# Patient Record
(demographics unavailable — no encounter records)

---

## 2024-11-06 NOTE — HISTORY OF PRESENT ILLNESS
[FreeTextEntry1] : The patient is a 68-year-old right-handed man who was diagnosed with Parkinson disease in 2013.  At that time he developed tingling in his arm after rotator cuff surgery.  He saw Dr. Otero and was diagnosed with Parkinson disease.  He was started on medications which did help him.  1. Since last visit had increased freezing during the nighttime. Taking Stalevo 100. 6 times day at 2-4-00-3-6-9 with LD half to 45min with at least three doses of Stalevo. First dose kicks in within 2 hours and lasts for an hour, the rest of them kick in within half an hour lasts for two hours. wearing off symptoms are anxiety, tingling in the toes and brain fog 30 minutes before dose. Currently on amantadine 100mg TID (tried tapering off, did not tolerate). When ON, feels great/ 2. He does have some mild dyskinesias which are not bothersome.  His voice and face are low when he is off.  He has no drooling or dysphagia.  He does have trouble turning over in bed at night.  His handwriting is poor.  His walking and balance are good. He still plays basketball regularly.  He does get a tremor of the right arm and right leg when off. 3. Concerns of constipation: not taking anything for that, plan to resume prune juice and MiraLAX. 4. Mood is good, anxiety is better. Currently on venlafaxine twice a day. He does have an enlarged prostate.  There is no orthostasis.  There is no family history of Parkinson disease or other movement disorders.  His father and daughter have ET. 5. Complains of hallucinations, people crossing the room, has insight in them. 6. Neuropsychological eval showed generally intact cognition, deficits in visuospatial domain.  7. Playing pickleball.   medications Stalevo 100 6 times a day 6-10-2-6-10 every 4 hours levodopa -carbidopa 25/100 as needed venlafaxine  previous medications Ropinirole xadago Nourianz

## 2024-11-06 NOTE — DISCUSSION/SUMMARY
[FreeTextEntry1] : In summary, the patient is a 68-year-old right-handed man who was diagnosed with Parkinson disease in 2013. Major bothersome symptoms are motor fluctuations with  non-motor off including brain fog, anxiety and tingling and restlessness in feet.  Currently on Stalevo 100 6 times a day and amantadine 100 TID. currently also experiencing hallucinations, advised to reduce the amantadine and eventually stop which may help with the hallucinations.  Had an extensive discussion with the patient and wife regarding there questions and concerns regarding the DBS. His major concerns are non-motor offs including brain fog, anxiety and tingling in feet. Discussed extensively about the DBS procedure and outcome. discussed that the non-motor off periods may or may not improve with the DBS but may overall improve the fluctuations and hence the off period.  Patient has still not made decision about the DBS and wants to wait for pump before the considering DBS  Plan: 1. Taper off and stop the amantadine  2. Trial of rytary 4 caps 23.75/95 tid 3. They want to see Dr Mcgovern again to get more questions answered, can schedule. Will put on list for SQ LD.   We discussed the above impression, plan and recommendations during the visit. Counseling represented more then 50% of the 30 minute visit time.

## 2024-11-06 NOTE — PHYSICAL EXAM
[Person] : oriented to person [Place] : oriented to place [Time] : oriented to time [Registration Intact] : recent registration memory intact [Naming Objects] : no difficulty naming common objects [Repeating Phrases] : no difficulty repeating a phrase [Writing A Sentence] : no difficulty writing a sentence [Fluency] : fluency intact [Comprehension] : comprehension intact [Reading] : reading intact [Cranial Nerves Optic (II)] : visual acuity intact bilaterally,  visual fields full to confrontation, pupils equal round and reactive to light [Cranial Nerves Oculomotor (III)] : extraocular motion intact [Cranial Nerves Trigeminal (V)] : facial sensation intact symmetrically [Cranial Nerves Facial (VII)] : face symmetrical [Cranial Nerves Glossopharyngeal (IX)] : tongue and palate midline [Cranial Nerves Vestibulocochlear (VIII)] : hearing was intact bilaterally [Cranial Nerves Accessory (XI - Cranial And Spinal)] : head turning and shoulder shrug symmetric [Cranial Nerves Hypoglossal (XII)] : there was no tongue deviation with protrusion [Motor Strength] : muscle strength was normal in all four extremities [Motor Handedness Right-Handed] : the patient is right hand dominant [Sensation Tactile Decrease] : light touch was intact [Sensation Vibration Decrease] : vibration was intact [1+] : Brachioradialis left 1+ [0] : Ankle jerk left 0 [Dysdiadochokinesia Bilaterally] : not present [Coordination - Dysmetria Impaired Finger-to-Nose Bilateral] : not present [FreeTextEntry1] : He was seen 45 min after the last dose.  Still OFF.   Facial expression and voice were normal.  Extraocular movements were intact and no square jerks seen.  Tone was increased tone at the right>left  upper extremity.   Rapid alternating movements and finger taps and hand  were slowed bilaterally, worse on the right.  Foot tap and toe tap were slightly slowed on the right.  He easily got up from the chair without using his arms.  Gait is normal based and steady with good heel-to-toe stride and absent arm swing.  Pull test was negative.  He had no tremors.  He does have a mild intermittent right leg resting tremor at that time.

## 2025-03-07 NOTE — PHYSICAL EXAM
[None] : none [Cooperative] : cooperative [Full] : full [Clear] : clear [Linear/Goal Directed] : linear/goal directed [Average] : average [WNL] : within normal limits [de-identified] : Pt had to stand up several times during the interview [FreeTextEntry8] : "not feeling great" [de-identified] : dysphoric, stable, congruent with content [FreeTextEntry7] : denies suicidal ideation/intent/plan or non-suicidal self-injurious ideation/intent/plan or homicidal ideation/intent/plan

## 2025-03-07 NOTE — DISCUSSION/SUMMARY
[Low acute suicide risk] : Low acute suicide risk [No] : No [FreeTextEntry1] : RISK ASSESSMENT Acute risk factors for self-injurious/aggressive behaviors include: depressive symptoms Chronic risk factors include: Chronic medical illness, Parkinson's/NSTEMI history, hx of trauma of daughter's passing, hx panic attack, hx depression with thoughts of death without intent or plan. Protective factors include: domiciled, , supportive wife, responsibility to grandchildren, help-seeking, denies current suicidal ideation/intent/plan or non-suicidal self-injurious ideation/intent/plan or homicidal ideation/intent/plan, no past psych hosp. Overall, the pt is at a low acute and low chronic risk for self-injurious, suicidal or aggressive behaviors, and is appropriate for outpatient care at this time.

## 2025-03-07 NOTE — PHYSICAL EXAM
[None] : none [Cooperative] : cooperative [Full] : full [Clear] : clear [Linear/Goal Directed] : linear/goal directed [Average] : average [WNL] : within normal limits [de-identified] : Pt had to stand up several times during the interview [FreeTextEntry8] : "not feeling great" [de-identified] : dysphoric, stable, congruent with content [FreeTextEntry7] : denies suicidal ideation/intent/plan or non-suicidal self-injurious ideation/intent/plan or homicidal ideation/intent/plan

## 2025-03-07 NOTE — SOCIAL HISTORY
[FreeTextEntry1] : Born: [place] Grew up: [place]. Mother, father, sister 11 years older, . Father: quiet, reserved, worked a lot. Mother: fair, good mother, took good care of pt. Sister: ok relationship. Education/GPA: attended [employer]. Worked as an  for 40 years.  , two kids. Daughter passed away, has a son and two grandchildren.

## 2025-03-07 NOTE — PLAN
[FreeTextEntry4] : On initial assessment with writer 3/7/2025: 69 yo M, domiciled with wife, working as an , past psychiatric history notable for depression, anxiety, denies past psychiatric hospitalizations, denies past suicide attempts, denies substance use, denies legal hx/hx of violence, past medical history notable for NSTEMI around 9/2024, s/p aortic valve replacement with hx bicuspid aortic valve, Parkinson's diagnosed in 2013, HLD, presents for treatment for depression.   On initial assessment, the differential includes MDD, r/o adjustment disorder with mixed anxiety and depression, r/o Parkinson's associated depressive symptoms. We discussed how medications for depression may be less effective with Parkinson's, though still confer benefit. Given pt's and pt's wife's interest in specialist care, we discussed the consideration for pt to look into pursuing care at [facility]/outpatient program for medication management/therapy. Pt indicated a preference to follow up with [facility] Geriatric outpatient program. Writer provided pt with resources about [facility] Geriatric outpatient program. Pt for now will be continuing with venlafaxine 75mg twice a day. He said that his cardiologist is aware, and writer advised pt pt to let his cardiologist know about all future medication changes given the history of NSTEMI, and venlafaxine's effect on increasing BP/HR.  Labs: 9/11/2023: LFTs wnl, A1c 5.7% H, chol 179 wnl, trig 97 wnl, HDL 44 wnl, LDL calc 117 H, 9/12/2023: CBC wnl except RBC count 6.02 H, Hg 17.6 H, Hct 52.2 H, BMP wnl except Chl 111 H, glucose 109 H 9/22/2023: EKG QTcB 461ms, auto read LBBB and left axis, left atrial enlargement   PLAN -Discussed the diagnosis, treatment, alternatives to recommended treatment, risk Vs benefits of treatment and no treatment and alternative treatments. -Lab/other tests: advised to continue to check annual bloodwork with PCP. -Medication: can continue as previously prescribed venlafaxine 75mg twice a day.   SNRI side effects including but not limited to GI side effects, dry mouth, constipation, sweating, increased BP, urinary retention, sedation, headaches, dizziness, serotonin syndrome, hyponatremia, QT prolongation, weight gain, decreased libido, and black box warning of SI in patients younger than 24 were discussed. -Educated patient of importance of remaining abstinent from drugs and alcohol. -Emergency procedures were discussed: pt. educated to call 911 or go to nearest ER for worsening of symptoms/suicidal/homicidal ideation. -Pt will look into following up with [facility] Geriatric outpatient program. -Patient given opportunity to ask questions and their questions were answered and they expressed understanding and agreement with above plan.  I spent a total of 60 minutes reviewing past medical records, evaluating the patient, discussing treatment options with the patient, prescribing medication, and documenting in the EMR.

## 2025-03-07 NOTE — FAMILY HISTORY
[FreeTextEntry1] : Psychiatric family history: mother with depression undiagnosed, father with dementia

## 2025-03-07 NOTE — SOCIAL HISTORY
[FreeTextEntry1] : Born: [place] Grew up: [place]. Mother, father, sister 11 years older, . Father: quiet, reserved, worked a lot. Mother: fair, good mother, took good care of pt. Sister: ok relationship. Education/GPA: attended [employer] college. Worked as an  for 40 years.  , two kids. Daughter passed away, has a son and two grandchildren.

## 2025-03-07 NOTE — HISTORY OF PRESENT ILLNESS
[FreeTextEntry1] : Pt was seen by writer for an initial appointment on 3/7/2025. ISTOP Reference #: [id number] nothing listed.  67 yo M, domiciled with wife, working as an , past psychiatric history notable for depression, anxiety, denies past psychiatric hospitalizations, denies past suicide attempts, denies substance use, denies legal hx/hx of violence, past medical history notable for NSTEMI around 9/2024, s/p aortic valve replacement with hx bicuspid aortic valve, Parkinson's diagnosed in 2013, HLD, presents for treatment for depression.  Sis Vera, pt's wife, is joining for today's appointment with pt's verbal consent. She says that pt has low motivation and it is hard to get him to do things other than watch TV.  Mood: "not feeling great" Anhedonia: denies, enjoys spending time with his grandchildren Motivation: "not great", lower than in the past Sleep: not good. Many interruptions to urinate, 2-4 interruptions, able to fall asleep again easily. Falls asleep easily at the outset. Takes naps. Appetite: good Energy: low Concentration: impaired Feelings of guilt/worthlessness: denies. Pt enjoys working. Suicidal ideation: denies currently, around July at anniversary of daughter's passing, pt had thoughts of death without intent or plan. Non-suicidal self-injurious ideation: denies past or current Homicidal ideation: denies past or current   Anxiety: endorses but feels that it is normal, per Sis it is at baseline. Muscle soreness: denies Irritability: denies Easily fatigued: denies Restless/keyed up/tense: denies Panic attacks: once, a long time ago. Took Xanax and it never came back.   Disordered eating Hx: denies past or current restricting/binging/purging behaviors.   EtOH use: denies Drug use: denies past or current Tobacco use: denies Caffeine use: 1 iced coffee every other day, 2 iced tea daily or more   Current or past psychosis: denies past or current auditory hallucinations. 6-12months ago saw things out of the corner of his eye that were not there, he knew they were not real. It got better and has not been present lately. It looked like a person passing by. Denies hx of paranoia. Denies thought broadcasting/insertion, denies IOR. Current or past jaret: denies past or current.   Trauma: denies   Firearm access: denies Current medications: venlafaxine 75mg twice a day, amantadine, carbidopa-levodopa ENTA, Sinamet as a boost, metoprolol, mirabegron, silodisin, simvastatin, vit B12, vit D. Therapy: not currently, is interested in therapy.  [FreeTextEntry2] : Pt was seen by Dr. Santos Albert for neuropsych eval in consideration for DBS for Parkinson's 3/27/2024. At that time, pt was noted to have had visual hallucinations in periphery from 2019, increased anxiety, agitation and apathy. Since 2022 pt has had increased distractibility, worsened concentration, word finding difficulties. He had sadness, loss of pleasure, passive suicidal ideation, irritability and self criticalness. Also had fear of the future and dying, and somatic symptoms of numbness and hot feelings.  Hx of psychotherapy with wife after the death of a family member, though no prior psychiatric condition.  Pt had seen Dr. Ramos in [place] for psychiatry for a couple of years, during COVID, last was over a year ago. Pt stopped as he stopped taking pt's insurance.  [FreeTextEntry3] : venlafaxine, Xanax, mirtazapine

## 2025-03-07 NOTE — HISTORY OF PRESENT ILLNESS
[FreeTextEntry1] : Pt was seen by writer for an initial appointment on 3/7/2025. ISTOP Reference #: [id number] nothing listed.  67 yo M, domiciled with wife, working as an , past psychiatric history notable for depression, anxiety, denies past psychiatric hospitalizations, denies past suicide attempts, denies substance use, denies legal hx/hx of violence, past medical history notable for NSTEMI around 9/2024, s/p aortic valve replacement with hx bicuspid aortic valve, Parkinson's diagnosed in 2013, HLD, presents for treatment for depression.  Sis Vera, pt's wife, is joining for today's appointment with pt's verbal consent. She says that pt has low motivation and it is hard to get him to do things other than watch TV.  Mood: "not feeling great" Anhedonia: denies, enjoys spending time with his grandchildren Motivation: "not great", lower than in the past Sleep: not good. Many interruptions to urinate, 2-4 interruptions, able to fall asleep again easily. Falls asleep easily at the outset. Takes naps. Appetite: good Energy: low Concentration: impaired Feelings of guilt/worthlessness: denies. Pt enjoys working. Suicidal ideation: denies currently, around July at anniversary of daughter's passing, pt had thoughts of death without intent or plan. Non-suicidal self-injurious ideation: denies past or current Homicidal ideation: denies past or current   Anxiety: endorses but feels that it is normal, per Sis it is at baseline. Muscle soreness: denies Irritability: denies Easily fatigued: denies Restless/keyed up/tense: denies Panic attacks: once, a long time ago. Took Xanax and it never came back.   Disordered eating Hx: denies past or current restricting/binging/purging behaviors.   EtOH use: denies Drug use: denies past or current Tobacco use: denies Caffeine use: 1 iced coffee every other day, 2 iced tea daily or more   Current or past psychosis: denies past or current auditory hallucinations. 6-12months ago saw things out of the corner of his eye that were not there, he knew they were not real. It got better and has not been present lately. It looked like a person passing by. Denies hx of paranoia. Denies thought broadcasting/insertion, denies IOR. Current or past jaret: denies past or current.   Trauma: denies   Firearm access: denies Current medications: venlafaxine 75mg twice a day, amantadine, carbidopa-levodopa ENTA, Sinamet as a boost, metoprolol, mirabegron, silodisin, simvastatin, vit B12, vit D. Therapy: not currently, is interested in therapy.  [FreeTextEntry2] : Pt was seen by Dr. Santos Albert for neuropsych eval in consideration for DBS for Parkinson's 3/27/2024. At that time, pt was noted to have had visual hallucinations in periphery from 2019, increased anxiety, agitation and apathy. Since 2022 pt has had increased distractibility, worsened concentration, word finding difficulties. He had sadness, loss of pleasure, passive suicidal ideation, irritability and self criticalness. Also had fear of the future and dying, and somatic symptoms of numbness and hot feelings.  Hx of psychotherapy with wife after the death of a family member, though no prior psychiatric condition.  Pt had seen Dr. Ramos in [place] for psychiatry for a couple of years, during COVID, last was over a year ago. Pt stopped as he stopped taking pt's insurance.  [FreeTextEntry3] : venlafaxine, Xanax, mirtazapine

## 2025-03-07 NOTE — PLAN
[FreeTextEntry4] : On initial assessment with writer 3/7/2025: 69 yo M, domiciled with wife, working as an , past psychiatric history notable for depression, anxiety, denies past psychiatric hospitalizations, denies past suicide attempts, denies substance use, denies legal hx/hx of violence, past medical history notable for NSTEMI around 9/2024, s/p aortic valve replacement with hx bicuspid aortic valve, Parkinson's diagnosed in 2013, HLD, presents for treatment for depression.   On initial assessment, the differential includes MDD, r/o adjustment disorder with mixed anxiety and depression, r/o Parkinson's associated depressive symptoms. We discussed how medications for depression may be less effective with Parkinson's, though still confer benefit. Given pt's and pt's wife's interest in specialist care, we discussed the consideration for pt to look into pursuing care at [facility]/outpatient program for medication management/therapy. Pt indicated a preference to follow up with [facility] Geriatric outpatient program. Writer provided pt with resources about [facility] Geriatric outpatient program. Pt for now will be continuing with venlafaxine 75mg twice a day. He said that his cardiologist is aware, and writer advised pt pt to let his cardiologist know about all future medication changes given the history of NSTEMI, and venlafaxine's effect on increasing BP/HR.  Labs: 9/11/2023: LFTs wnl, A1c 5.7% H, chol 179 wnl, trig 97 wnl, HDL 44 wnl, LDL calc 117 H, 9/12/2023: CBC wnl except RBC count 6.02 H, Hg 17.6 H, Hct 52.2 H, BMP wnl except Chl 111 H, glucose 109 H 9/22/2023: EKG QTcB 461ms, auto read LBBB and left axis, left atrial enlargement   PLAN -Discussed the diagnosis, treatment, alternatives to recommended treatment, risk Vs benefits of treatment and no treatment and alternative treatments. -Lab/other tests: advised to continue to check annual bloodwork with PCP. -Medication: can continue as previously prescribed venlafaxine 75mg twice a day.   SNRI side effects including but not limited to GI side effects, dry mouth, constipation, sweating, increased BP, urinary retention, sedation, headaches, dizziness, serotonin syndrome, hyponatremia, QT prolongation, weight gain, decreased libido, and black box warning of SI in patients younger than 24 were discussed. -Educated patient of importance of remaining abstinent from drugs and alcohol. -Emergency procedures were discussed: pt. educated to call 911 or go to nearest ER for worsening of symptoms/suicidal/homicidal ideation. -Pt will look into following up with [facility] Geriatric outpatient program. -Patient given opportunity to ask questions and their questions were answered and they expressed understanding and agreement with above plan.  I spent a total of 60 minutes reviewing past medical records, evaluating the patient, discussing treatment options with the patient, prescribing medication, and documenting in the EMR.

## 2025-03-07 NOTE — RISK ASSESSMENT
[Yes] : 1. Passive Ideation: Have you wished you were dead or wished you could go to sleep and not wake up? Yes [No] : No

## 2025-05-27 NOTE — HISTORY OF PRESENT ILLNESS
[FreeTextEntry1] : The patient is a 69-year-old right-handed man who was diagnosed with Parkinson disease in 2013.  At that time he developed tingling in his arm after rotator cuff surgery.  He saw Dr. Otero and was diagnosed with Parkinson disease.  He was started on medications which did help him.  1. Since last visit had increased freezing during the nighttime. Taking Stalevo 100. 6 times day at 3-5-95-3-6-9 with LD half to 45min with at least three doses of Stalevo. Each dose lasts only about 2 hours. Extra sinemet prn. When ON, no longer feels as well.  2. Currently on amantadine 100mg TID (tried tapering off, did not tolerate), has some livedo reticularis 3. He does have some mild dyskinesias which are not bothersome.  His voice and face are low when he is off.  He has no drooling or dysphagia.  He does have trouble turning over in bed at night.  His handwriting is poor.  His walking and balance are good. He still plays basketball regularly.  He does get a tremor of the right arm and right leg when off. 3. Concerns of constipation: not taking anything for that, plan to resume prune juice and MiraLAX. 4. Mood is good, anxiety is better. Currently on venlafaxine twice a day. He does have an enlarged prostate.  There is no orthostasis.  There is no family history of Parkinson disease or other movement disorders.  His father and daughter have ET. 5. Complains of hallucinations, people crossing the room, has insight in them. 6. Neuropsychological evaluation in 2024 showed generally intact cognition, deficits in visuospatial domain.  7. Playing pickleball.   medications Stalevo 100 6 times a day 6-10-2-6-10 every 4 hours levodopa -carbidopa 25/100 as needed venlafaxine  previous medications Ropinirole patricia Campos

## 2025-05-27 NOTE — PHYSICAL EXAM
[Person] : oriented to person [Place] : oriented to place [Time] : oriented to time [Registration Intact] : recent registration memory intact [Naming Objects] : no difficulty naming common objects [Repeating Phrases] : no difficulty repeating a phrase [Writing A Sentence] : no difficulty writing a sentence [Fluency] : fluency intact [Comprehension] : comprehension intact [Reading] : reading intact [Cranial Nerves Optic (II)] : visual acuity intact bilaterally,  visual fields full to confrontation, pupils equal round and reactive to light [Cranial Nerves Oculomotor (III)] : extraocular motion intact [Cranial Nerves Trigeminal (V)] : facial sensation intact symmetrically [Cranial Nerves Facial (VII)] : face symmetrical [Cranial Nerves Vestibulocochlear (VIII)] : hearing was intact bilaterally [Cranial Nerves Glossopharyngeal (IX)] : tongue and palate midline [Cranial Nerves Accessory (XI - Cranial And Spinal)] : head turning and shoulder shrug symmetric [Motor Strength] : muscle strength was normal in all four extremities [Motor Handedness Right-Handed] : the patient is right hand dominant [Sensation Tactile Decrease] : light touch was intact [Sensation Vibration Decrease] : vibration was intact [Dysdiadochokinesia Bilaterally] : not present [Coordination - Dysmetria Impaired Finger-to-Nose Bilateral] : not present [FreeTextEntry1] : He was seen 45 min after the last dose.  Still OFF.   Facial expression and voice were normal.  Extraocular movements were intact and no square jerks seen.  Tone was increased tone at the right>left  upper extremity.  (3-4 on right) Rapid alternating movements and finger taps and hand  were slowed bilaterally, worse on the right.  Foot tap and toe tap were  slowed on the right.  He easily got up from the chair without using his arms.  Gait is normal based and steady with good heel-to-toe stride and absent right arm swing.  Pull test was negative.   He does have a mild intermittent R> left arm and right leg resting tremor

## 2025-05-27 NOTE — DISCUSSION/SUMMARY
[FreeTextEntry1] : In summary, the patient is a 69-year-old right-handed man who was diagnosed with Parkinson disease in 2013. Major bothersome symptoms are motor fluctuations with  non-motor off including brain fog, anxiety and tingling and restlessness in feet.  Currently on Stalevo 100 6 times a day and amantadine 100 TID. currently also experiencing hallucinations, advised to reduce the amantadine and eventually stop which may help with the hallucinations.  Had an extensive discussion with the patient and wife regarding there questions and concerns regarding the DBS. His major concerns are non-motor offs including brain fog, anxiety and tingling in feet. Discussed extensively about the DBS procedure and outcome. discussed that the non-motor off periods may or may not improve with the DBS but may overall improve the fluctuations and hence the off period.  Patient has still not made decision about the DBS and wants to wait for pump before the considering DBS  Plan: 1. Continue amantadine 2. Trial of crexont, continue ER at night and IR prn 3. They want to see Dr Mcgovern again to get more questions answered, can schedule. Will put on list for [facility]/[place]  We discussed the above impression, plan and recommendations during the visit. Counseling represented more then 50% of the 30 minute visit time.

## 2025-06-13 NOTE — HISTORY OF PRESENT ILLNESS
[FreeTextEntry1] : Greg   69-year-old male with PD since 2013 presents for pre-surgical testing. Patient experiences motor fluctuations including levodopa-sensitive off symptoms, treatment-induced dyskinesia, limb bradykinesia, and tremor. He reports bilateral toe curling mostly at night but can happen during the day as well. He needs more help with dressing and putting on his seat belt. His OFF periods are unpredictable. Reports non motor offs including anxiety and brain fog.  He is planning on seeing a new psychiatrist later this month.  Last dose of levodopa was taken yesterday at 8pm   Current meds  Taking some form of L-dopa every 2-3 hours Crexont stalevo sinemet  Amantadine 100mg TID  Effexor 75mg BID  Past meds Rasagaline

## 2025-06-13 NOTE — DISCUSSION/SUMMARY
[FreeTextEntry1] : 69-year-old male with PD since 2013 presents for pre-surgical testing. On today's CAPSIT, the patient demonstrated a 55% improvement in his MDS-UPDRS part III assessment.  The patient was informed of programming protocols and possible AE's of stimulation. He was referred for a neuropsych reassessment with Dr Albert.

## 2025-06-13 NOTE — PHYSICAL EXAM
[General Appearance - Alert] : alert [Oriented To Time, Place, And Person] : oriented to person, place, and time [FreeTextEntry1] : OFF state - MDS UPDRS III score:  51  +2 masking +2 hypophonia. Patient has mild rest, postural and action tremors that are worse on the right. There is R>L bradykinesia with ARMANDO assessment. Arises with arms crossed, normal gait initiation, reduced SL, absent arm swing and multistep turns. Negative pull test.      ON state - MDS UPRDS III score:  23  It took 40 min for 300mg of LD to take effect   Complete reduction in rest, postural and action tremor. Bradykinesia demonstrated improvement. Neck and limb rigidity is about the same. Slight overflow movements observed.   MDS UPRDS I: 18 MDS UPRDS II: 17 MDS UPRDS IV:10